# Patient Record
Sex: FEMALE | Race: BLACK OR AFRICAN AMERICAN
[De-identification: names, ages, dates, MRNs, and addresses within clinical notes are randomized per-mention and may not be internally consistent; named-entity substitution may affect disease eponyms.]

---

## 2018-06-04 ENCOUNTER — HOSPITAL ENCOUNTER (OUTPATIENT)
Dept: HOSPITAL 74 - JASU-SURG | Age: 58
Discharge: HOME | End: 2018-06-04
Attending: UROLOGY
Payer: COMMERCIAL

## 2018-06-04 VITALS — TEMPERATURE: 97.8 F

## 2018-06-04 VITALS — SYSTOLIC BLOOD PRESSURE: 120 MMHG | DIASTOLIC BLOOD PRESSURE: 70 MMHG | HEART RATE: 64 BPM

## 2018-06-04 VITALS — BODY MASS INDEX: 28 KG/M2

## 2018-06-04 DIAGNOSIS — N20.0: Primary | ICD-10-CM

## 2018-06-04 PROCEDURE — 0TF4XZZ FRAGMENTATION IN LEFT KIDNEY PELVIS, EXTERNAL APPROACH: ICD-10-PCS | Performed by: UROLOGY

## 2018-06-04 NOTE — OP
Operative Note





- Note:


Operative Date: 06/04/18


Pre-Operative Diagnosis: LEFT KIDNEY STONE


Operation: LEFT ESWL


Findings: 





5mm mid pole Left kidney stone


Post-Operative Diagnosis: Same as Pre-op


Surgeon: Dave Ghotra


Anesthesia: Fractional

## 2018-06-04 NOTE — OP
DATE OF OPERATION:  06/04/2018

 

PREOPERATIVE DIAGNOSIS:  Left renal stone.

 

POSTOPERATIVE DIAGNOSIS:  Left renal stone.

 

PROCEDURE:  Left extracorporeal shock wave lithotripsy.

 

ATTENDING:  Mg Caldwell MD

 

ANESTHESIA:  Fractional.

 

DESCRIPTION OF OPERATION:  Patient was brought in the operating room and placed in

supine position on the operating room table.  Ultrasonography and fluoroscopy were

performed.  A 5-mm left mid-pole stone was identified.  At this point, fractional

anesthesia was administered as well as preoperative antibiotics.  Shock wave

lithotripsy was then performed; 2500 impulses at 17 joules of power were administered

to a 5-mm left mid-pole stone with excellent fragmentation noted under real-time

ultrasonography and fluoroscopy.  No complications were noted.  The disposition of

the patient was to the recovery room.

 

 

MG CALDWELL M.D.

 

[id number]

DD: 06/04/2018 14:29

DT: 06/04/2018 20:51

Job #:  [id number]